# Patient Record
Sex: FEMALE | Race: OTHER
[De-identification: names, ages, dates, MRNs, and addresses within clinical notes are randomized per-mention and may not be internally consistent; named-entity substitution may affect disease eponyms.]

---

## 2023-07-26 ENCOUNTER — APPOINTMENT (OUTPATIENT)
Dept: OTOLARYNGOLOGY | Facility: CLINIC | Age: 58
End: 2023-07-26

## 2023-08-25 PROBLEM — Z00.00 ENCOUNTER FOR PREVENTIVE HEALTH EXAMINATION: Status: ACTIVE | Noted: 2023-08-25

## 2023-08-31 ENCOUNTER — NON-APPOINTMENT (OUTPATIENT)
Age: 58
End: 2023-08-31

## 2023-08-31 ENCOUNTER — APPOINTMENT (OUTPATIENT)
Dept: VASCULAR SURGERY | Facility: CLINIC | Age: 58
End: 2023-08-31
Payer: MEDICAID

## 2023-08-31 VITALS
HEART RATE: 63 BPM | BODY MASS INDEX: 28.93 KG/M2 | SYSTOLIC BLOOD PRESSURE: 113 MMHG | WEIGHT: 180 LBS | DIASTOLIC BLOOD PRESSURE: 80 MMHG | HEIGHT: 66 IN

## 2023-08-31 DIAGNOSIS — I83.90 ASYMPTOMATIC VARICOSE VEINS OF UNSPECIFIED LOWER EXTREMITY: ICD-10-CM

## 2023-08-31 DIAGNOSIS — G47.62 SLEEP RELATED LEG CRAMPS: ICD-10-CM

## 2023-08-31 DIAGNOSIS — Z78.9 OTHER SPECIFIED HEALTH STATUS: ICD-10-CM

## 2023-08-31 PROCEDURE — 93970 EXTREMITY STUDY: CPT

## 2023-08-31 PROCEDURE — 99204 OFFICE O/P NEW MOD 45 MIN: CPT

## 2023-08-31 RX ORDER — PSYLLIUM HUSK 0.4 G
CAPSULE ORAL
Refills: 0 | Status: ACTIVE | COMMUNITY

## 2023-08-31 NOTE — REVIEW OF SYSTEMS
[As Noted in HPI] : as noted in HPI [Limb Pain] : limb pain [Negative] : Heme/Lymph [Leg Claudication] : no intermittent leg claudication

## 2023-08-31 NOTE — PHYSICAL EXAM
[Respiratory Effort] : normal respiratory effort [Normal Heart Sounds] : normal heart sounds [2+] : left 2+ [Varicose Veins Of Lower Extremities] : bilaterally [Ankle Swelling On The Right] : mild [No Rash or Lesion] : No rash or lesion [Calm] : calm [Ankle Swelling (On Exam)] : not present [] : not present [Abdomen Tenderness] : ~T ~M No abdominal tenderness [de-identified] : WN/WD, NAD [de-identified] : NC/AT [de-identified] : supple [de-identified] : +FROM 5/5x4 [de-identified] : grossly intact

## 2023-08-31 NOTE — PROCEDURE
[FreeTextEntry1] : BL venous doppler was done in the office demonstrating no evidence of SVT/DVT, small gross varicosities noted. +fluid collection noted over right medial knee.

## 2023-08-31 NOTE — ADDENDUM
[FreeTextEntry1] : This note was written by Antionette LAWTON, acting as a scribe for Dr. Berto Kam.  I, Dr. Berto Kam, have read and attest that all the information, medical decision-making, and discharge instructions within are true and accurate.   I, Dr. Berto Kam, personally performed the evaluation and management (E/M) services for this new patient.  That E/M includes conducting the initial examination, assessing all conditions, and establishing the plan of care.  Today, my ACP, Antionette LAWTON, was here to observe my evaluation and management services for this patient to be followed going forward.

## 2023-08-31 NOTE — ASSESSMENT
[FreeTextEntry1] : 58yoF, Czech speaking, non-smoker with no significant PMHx who is referred by her rheumatologist to be evaluated for bilateral legs cramps at night.  Patient with strong palpable peripheral pulses, small varicose veins noted over b/l calves, R>L, no evidence of edema or skin changes. BL venous doppler was done in the office demonstrating no evidence of SVT/DVT, small gross varicosities noted. +fluid collection noted over right medial knee. We discussed the findings and explained that her symptoms are non-vascular. No intervention is needed at this time, pt was recommended to stay active, elevate her legs and wear compression stockings..

## 2023-08-31 NOTE — HISTORY OF PRESENT ILLNESS
[FreeTextEntry1] : 58yoF, Swedish speaking, non-smoker with no significant PMHx who is referred by her rheumatologist, Dr. Radha Handy to be evaluated for bilateral legs cramps at night. Patient states that she has been experiencing right knee pain, but after a Cortisone shot her symptoms resolved and now, she c/o toes cramps when she swims in a pool and calves' cramps at night. She also noticed varicose veins over her bilateral lower legs, R>L, occasional right lower leg edema. She is able to ambulate without difficulties, denies claudication, rest pain, skin changes, no hx of SVT/DVT.